# Patient Record
Sex: MALE
[De-identification: names, ages, dates, MRNs, and addresses within clinical notes are randomized per-mention and may not be internally consistent; named-entity substitution may affect disease eponyms.]

---

## 2021-09-09 PROBLEM — Z00.00 ENCOUNTER FOR PREVENTIVE HEALTH EXAMINATION: Status: ACTIVE | Noted: 2021-09-09

## 2021-11-10 ENCOUNTER — APPOINTMENT (OUTPATIENT)
Dept: UROLOGY | Facility: CLINIC | Age: 71
End: 2021-11-10
Payer: MEDICARE

## 2021-11-10 VITALS
HEIGHT: 67 IN | WEIGHT: 211 LBS | HEART RATE: 75 BPM | TEMPERATURE: 98 F | DIASTOLIC BLOOD PRESSURE: 90 MMHG | BODY MASS INDEX: 33.12 KG/M2 | SYSTOLIC BLOOD PRESSURE: 140 MMHG

## 2021-11-10 DIAGNOSIS — Z80.7 FAMILY HISTORY OF OTHER MALIGNANT NEOPLASMS OF LYMPHOID, HEMATOPOIETIC AND RELATED TISSUES: ICD-10-CM

## 2021-11-10 DIAGNOSIS — Z80.0 FAMILY HISTORY OF MALIGNANT NEOPLASM OF DIGESTIVE ORGANS: ICD-10-CM

## 2021-11-10 DIAGNOSIS — F41.9 ANXIETY DISORDER, UNSPECIFIED: ICD-10-CM

## 2021-11-10 DIAGNOSIS — I10 ESSENTIAL (PRIMARY) HYPERTENSION: ICD-10-CM

## 2021-11-10 DIAGNOSIS — Z78.9 OTHER SPECIFIED HEALTH STATUS: ICD-10-CM

## 2021-11-10 PROCEDURE — 99204 OFFICE O/P NEW MOD 45 MIN: CPT

## 2021-11-10 RX ORDER — LOSARTAN POTASSIUM 100 MG/1
100 TABLET, FILM COATED ORAL
Refills: 0 | Status: ACTIVE | COMMUNITY

## 2021-11-10 RX ORDER — SILDENAFIL 20 MG/1
20 TABLET ORAL
Refills: 0 | Status: ACTIVE | COMMUNITY

## 2021-11-10 NOTE — LETTER HEADER
[FreeTextEntry3] : Maylin Samayoa M.D.\par Director of Urology\par [facility]\par [address] [place] [address], [address]\[place] [address]

## 2021-11-10 NOTE — ASSESSMENT
[FreeTextEntry1] : The exam other than for the mild testicular atrophy and obesity was better than I had expected from his description of his residual function.  He has known peripheral neuropathy is a diet-controlled diabetic but the vascular status given his recent work-up reportedly is acceptable.\par \par Going to recommend hormonal testing.  He was borderline normal years ago and given his obesity he may have hyper conversion.  Though he has had a battery of cardiac tests I do not feel comfortable making any other interpretation of them so I do not want a letter of [place] criteria classification.\par \par If his hormones are normal and his [place] criteria classification is equal to "I" I would have him go to 100 mg and see if that works.  If either that or if he is willing to try maximal dose Cialis does not work then he can consider self injection.  Given his insurance we would not do a Doppler we would just teach him how to do it was a low dose and he would work his way up or down depending on the response.  Obviously he would prefer to work with pills the needles but that may not be an option.

## 2021-11-10 NOTE — HISTORY OF PRESENT ILLNESS
[FreeTextEntry1] : Champ is a 71-year-old man born October 28, 1950 who presents for cure for urethrogram for worsening erectile dysfunction.  He has not seen back in September 1999 when I that his free testosterone was low normal but still normal I had recommended Viagra that we had discussed self injection, vacuum device and if none of that worked of course an implant was available.  He really did not do much of anything for many years as though it helped somewhat his function was enough that he really prefer not to take it.  He feels that it began to get worse last summer when he took Zoloft as an attempt to minimize his OCD side effects.  He stopped the Zoloft but the penis never came back.  He tried Viagra first at the 20 then at the 40 mg dose, being afraid to take more as he is concerned about heart disease.  However he has had a complete work-up including tests of the central and peripheral vessels stress test etc. this summer and reportedly they were all acceptable.  He did not go above 40 mg I am not sure why and he has not had repeat hormone testing.  He has a normal libido and he can get barely rigid enough to just start to penetrate he gets a small amount of free lubrication comes out by then his penis softens and they stopped.  Last time they were able to have satisfactory his penis vaginal contact was  performed  July.  Since then there has been really nothing they would consider satisfactory.  He is here today to see what else we can do\par \par He denies any issues with voiding dysfunction is no history of kidney stones urinary tract infections etc. he had bloods done back in July 2021 please see below [Erectile Dysfunction] : Erectile Dysfunction

## 2021-11-10 NOTE — LETTER BODY
[Consult Letter:] : I had the pleasure of evaluating your patient, [unfilled]. [Please see my note below.] : Please see my note below. [Consult Closing:] : Thank you very much for allowing me to participate in the care of this patient.  If you have any questions, please do not hesitate to contact me. [Sincerely,] : Sincerely, [FreeTextEntry2] : .Eugene

## 2021-11-10 NOTE — PHYSICAL EXAM
[General Appearance - Well Developed] : well developed [General Appearance - Well Nourished] : well nourished [Normal Appearance] : normal appearance [Well Groomed] : well groomed [General Appearance - In No Acute Distress] : no acute distress [Heart Rate And Rhythm] : Heart rate and rhythm were normal [] : no respiratory distress [Respiration, Rhythm And Depth] : normal respiratory rhythm and effort [Exaggerated Use Of Accessory Muscles For Inspiration] : no accessory muscle use [Auscultation Breath Sounds / Voice Sounds] : lungs were clear to auscultation bilaterally [Abdomen Soft] : soft [Abdomen Tenderness] : non-tender [Abdomen Hernia] : no hernia was discovered [Costovertebral Angle Tenderness] : no ~M costovertebral angle tenderness [Penis Abnormality] : normal circumcised penis [Testes Tenderness] : no tenderness of the testes [Testes Mass (___cm)] : there were no testicular masses [Prostate Size ___ gm] : prostate size [unfilled] gm [Normal Station and Gait] : the gait and station were normal for the patient's age [FreeTextEntry1] : Deep tendon reflexes were normal [Oriented To Time, Place, And Person] : oriented to person, place, and time [Affect] : the affect was normal [Mood] : the mood was normal [Not Anxious] : not anxious

## 2023-04-24 ENCOUNTER — APPOINTMENT (OUTPATIENT)
Dept: UROLOGY | Facility: CLINIC | Age: 73
End: 2023-04-24
Payer: MEDICARE

## 2023-04-24 VITALS
HEART RATE: 80 BPM | TEMPERATURE: 98 F | WEIGHT: 215 LBS | DIASTOLIC BLOOD PRESSURE: 82 MMHG | BODY MASS INDEX: 33.74 KG/M2 | HEIGHT: 67 IN | SYSTOLIC BLOOD PRESSURE: 135 MMHG

## 2023-04-24 DIAGNOSIS — N52.9 MALE ERECTILE DYSFUNCTION, UNSPECIFIED: ICD-10-CM

## 2023-04-24 DIAGNOSIS — F42.9 OBSESSIVE-COMPULSIVE DISORDER, UNSPECIFIED: ICD-10-CM

## 2023-04-24 DIAGNOSIS — B35.6 TINEA CRURIS: ICD-10-CM

## 2023-04-24 PROCEDURE — 99214 OFFICE O/P EST MOD 30 MIN: CPT

## 2023-04-24 RX ORDER — CLOTRIMAZOLE 10 MG/G
1 CREAM TOPICAL
Qty: 1 | Refills: 2 | Status: ACTIVE | COMMUNITY
Start: 2023-04-24 | End: 1900-01-01

## 2023-04-24 RX ORDER — CLONAZEPAM 0.25 MG/1
0.25 TABLET, ORALLY DISINTEGRATING ORAL
Refills: 0 | Status: ACTIVE | COMMUNITY

## 2023-04-24 NOTE — ASSESSMENT
[FreeTextEntry1] : He essentially is where we were at the last visit except that he knows that his hormones are still normal.  I still do not have [place] criteria and he stopped at 60 mg of sildenafil though he has been told if his doctor says his heart is okay he can go to 100.  He is like a man sitting in a smorgasbord takes a small bite of food and says I am still hungry.\par \par From a urologic viewpoint I still need [place] criteria classification.  Assuming it is normal i.e. equal to "I" initiated 100 mg if he does not want to that his choice all other options are higher risk.  If 100 mg works either normally or well enough that he has his answer if it does not work we can then consider Cialis at the 10 to 20 mg dose that she cannot predict from 1 PDE 5 inhibitor to another and if that does not work she will have to consider a self injection program or more invasive testing.  Theoretically we can try a vacuum device but the success rate has been abysmal

## 2023-04-24 NOTE — LETTER BODY
[Dear  ___] : Dear  [unfilled], [Courtesy Letter:] : I had the pleasure of seeing your patient, [unfilled], in my office today. [Please see my note below.] : Please see my note below. [Sincerely,] : Sincerely, [FreeTextEntry2] : Tyrell Knight MD

## 2023-04-24 NOTE — ADDENDUM
[FreeTextEntry1] : The pathophysiology why poor diet obesity borderline diabetes etc. will affect the blood flow into the penis as well as the ability of the penis to hold the blood was reviewed the distinction between the risk of the pills as being toxic to the heart as opposed to the sexual activity and the demands of places on the heart being the risk were again reviewed and the pills are not risky.  I also discussed the risks of a self injection program not withstanding the discomfort but the risk of priapism and of scarring again not a risk to the heart but the erection and the sexual ability it will afford to all which stresses the heart and we need to be sure that safe.\par \par Add in his anxiety which is worsened by his lack of function and his brain is fighting his penis

## 2023-04-24 NOTE — PHYSICAL EXAM
[General Appearance - Well Developed] : well developed [General Appearance - Well Nourished] : well nourished [Normal Appearance] : normal appearance [General Appearance - In No Acute Distress] : no acute distress [Well Groomed] : well groomed [Abdomen Soft] : soft [Abdomen Tenderness] : non-tender [Abdomen Hernia] : no hernia was discovered [Costovertebral Angle Tenderness] : no ~M costovertebral angle tenderness [Penis Abnormality] : normal circumcised penis [Testes Tenderness] : no tenderness of the testes [Testes Mass (___cm)] : there were no testicular masses [Prostate Size ___ gm] : prostate size [unfilled] gm [Heart Rate And Rhythm] : Heart rate and rhythm were normal [] : no respiratory distress [Respiration, Rhythm And Depth] : normal respiratory rhythm and effort [Exaggerated Use Of Accessory Muscles For Inspiration] : no accessory muscle use [Auscultation Breath Sounds / Voice Sounds] : lungs were clear to auscultation bilaterally [Oriented To Time, Place, And Person] : oriented to person, place, and time [Affect] : the affect was normal [Mood] : the mood was normal [Not Anxious] : not anxious [Normal Station and Gait] : the gait and station were normal for the patient's age [FreeTextEntry1] : Deep tendon reflexes were normal

## 2023-04-24 NOTE — LETTER HEADER
[FreeTextEntry3] : Maylin Samayoa M.D.\par Director of Urology\par [facility]\par [address] [place] [address], [address]\[place] [address]

## 2023-04-24 NOTE — REVIEW OF SYSTEMS
[see HPI] : see HPI [Anxiety] : anxiety [Erectile Dysfunction] : erectile dysfunction [Negative] : Heme/Lymph

## 2023-04-24 NOTE — HISTORY OF PRESENT ILLNESS
[FreeTextEntry1] : Vonnie is a 72-year-old male born October 28, 1950 seen November 10, 2021 referred for worsening erectile function I initially had seen him in September 1999 at that time the hormones were low normal but still normal I have recommended Viagra we have discussed injection vacuum device etc.  Over time he did nothing and then the summer 2021 things got worse especially when he started taking Zoloft because of the OCD.  He stopped the Zoloft the erections never returned he eventually started taking Viagra but did not go above 40 mg because he was worried about heart disease.  Of note he had a cardiac work-up at that time and was told it was okay he chose to stay with 40.\par \par At that time there was mild testicular atrophy, BMI was 33 and we knew that he had peripheral neuropathy secondary to his pre diabetes though his vascular status was acceptable.  I had wanted [place] criteria classification and repeat hormonal testing and if that was okay I have to go to 100 mg of Viagra if that was not acceptable but did not work we could then try Cialis.  If that was not successful we will consider an injection program.\par \par He did not come back until now as the function has progressed.  They tell me that since last seen he tried 60 mg of sildenafil it did not work and he was afraid to go to 100.\par \par They tell me he had some further cardiac studies do not have [place] criteria he had bloods drawn January 23, 2023 and is here to discuss what we can do as with what he is doing for now nothing is working [Erectile Dysfunction] : Erectile Dysfunction